# Patient Record
Sex: FEMALE | Race: WHITE | NOT HISPANIC OR LATINO
[De-identification: names, ages, dates, MRNs, and addresses within clinical notes are randomized per-mention and may not be internally consistent; named-entity substitution may affect disease eponyms.]

---

## 2017-01-15 ENCOUNTER — RESULT REVIEW (OUTPATIENT)
Age: 47
End: 2017-01-15

## 2019-08-28 ENCOUNTER — RESULT REVIEW (OUTPATIENT)
Age: 49
End: 2019-08-28

## 2022-07-08 ENCOUNTER — APPOINTMENT (OUTPATIENT)
Dept: HEART AND VASCULAR | Facility: CLINIC | Age: 52
End: 2022-07-08

## 2022-07-08 ENCOUNTER — TRANSCRIPTION ENCOUNTER (OUTPATIENT)
Age: 52
End: 2022-07-08

## 2022-07-08 VITALS
OXYGEN SATURATION: 96 % | HEART RATE: 64 BPM | WEIGHT: 184 LBS | TEMPERATURE: 97.4 F | RESPIRATION RATE: 16 BRPM | DIASTOLIC BLOOD PRESSURE: 84 MMHG | BODY MASS INDEX: 27.25 KG/M2 | SYSTOLIC BLOOD PRESSURE: 140 MMHG | HEIGHT: 69 IN

## 2022-07-08 DIAGNOSIS — Z87.09 PERSONAL HISTORY OF OTHER DISEASES OF THE RESPIRATORY SYSTEM: ICD-10-CM

## 2022-07-08 DIAGNOSIS — Z87.898 PERSONAL HISTORY OF OTHER SPECIFIED CONDITIONS: ICD-10-CM

## 2022-07-08 DIAGNOSIS — Z86.69 PERSONAL HISTORY OF OTHER DISEASES OF THE NERVOUS SYSTEM AND SENSE ORGANS: ICD-10-CM

## 2022-07-08 DIAGNOSIS — Z83.3 FAMILY HISTORY OF DIABETES MELLITUS: ICD-10-CM

## 2022-07-08 DIAGNOSIS — Z82.49 FAMILY HISTORY OF ISCHEMIC HEART DISEASE AND OTHER DISEASES OF THE CIRCULATORY SYSTEM: ICD-10-CM

## 2022-07-08 DIAGNOSIS — Z78.9 OTHER SPECIFIED HEALTH STATUS: ICD-10-CM

## 2022-07-08 DIAGNOSIS — R53.83 OTHER FATIGUE: ICD-10-CM

## 2022-07-08 DIAGNOSIS — R07.89 OTHER CHEST PAIN: ICD-10-CM

## 2022-07-08 DIAGNOSIS — Z87.11 PERSONAL HISTORY OF PEPTIC ULCER DISEASE: ICD-10-CM

## 2022-07-08 DIAGNOSIS — U07.1 COVID-19: ICD-10-CM

## 2022-07-08 DIAGNOSIS — Z87.19 PERSONAL HISTORY OF OTHER DISEASES OF THE DIGESTIVE SYSTEM: ICD-10-CM

## 2022-07-08 DIAGNOSIS — I48.91 UNSPECIFIED ATRIAL FIBRILLATION: ICD-10-CM

## 2022-07-08 DIAGNOSIS — R06.00 DYSPNEA, UNSPECIFIED: ICD-10-CM

## 2022-07-08 DIAGNOSIS — I10 ESSENTIAL (PRIMARY) HYPERTENSION: ICD-10-CM

## 2022-07-08 DIAGNOSIS — R00.2 PALPITATIONS: ICD-10-CM

## 2022-07-08 DIAGNOSIS — Z86.010 PERSONAL HISTORY OF COLONIC POLYPS: ICD-10-CM

## 2022-07-08 PROCEDURE — 99205 OFFICE O/P NEW HI 60 MIN: CPT

## 2022-07-08 PROCEDURE — 93246 EXT ECG>7D<15D RECORDING: CPT

## 2022-07-08 PROCEDURE — 93306 TTE W/DOPPLER COMPLETE: CPT

## 2022-07-08 RX ORDER — METOPROLOL SUCCINATE 25 MG/1
25 TABLET, EXTENDED RELEASE ORAL DAILY
Qty: 30 | Refills: 11 | Status: ACTIVE | COMMUNITY
Start: 2022-07-08 | End: 1900-01-01

## 2022-07-10 PROBLEM — Z86.010 HISTORY OF COLONIC POLYPS: Status: RESOLVED | Noted: 2022-07-10 | Resolved: 2022-07-10

## 2022-07-10 PROBLEM — R06.00 DYSPNEA ON EXERTION: Status: ACTIVE | Noted: 2022-07-10

## 2022-07-10 PROBLEM — Z87.09 HISTORY OF SINUSITIS: Status: RESOLVED | Noted: 2022-07-10 | Resolved: 2022-07-10

## 2022-07-10 PROBLEM — Z86.69 HISTORY OF MIGRAINE HEADACHES: Status: RESOLVED | Noted: 2022-07-10 | Resolved: 2022-07-10

## 2022-07-10 PROBLEM — Z87.19 HISTORY OF IRRITABLE BOWEL SYNDROME: Status: RESOLVED | Noted: 2022-07-10 | Resolved: 2022-07-10

## 2022-07-10 PROBLEM — Z87.11 HISTORY OF PEPTIC ULCER: Status: RESOLVED | Noted: 2022-07-10 | Resolved: 2022-07-10

## 2022-07-10 PROBLEM — I48.91 ATRIAL FIBRILLATION: Status: ACTIVE | Noted: 2022-07-08

## 2022-07-10 PROBLEM — R07.89 CHEST PRESSURE: Status: ACTIVE | Noted: 2022-07-10

## 2022-07-10 PROBLEM — U07.1 COVID-19: Status: RESOLVED | Noted: 2022-07-10 | Resolved: 2022-07-10

## 2022-07-10 PROBLEM — Z87.898 HISTORY OF ALOPECIA: Status: RESOLVED | Noted: 2022-07-10 | Resolved: 2022-07-10

## 2022-07-10 PROBLEM — Z87.19 HISTORY OF GASTROESOPHAGEAL REFLUX (GERD): Status: RESOLVED | Noted: 2022-07-10 | Resolved: 2022-07-10

## 2022-07-10 PROBLEM — R53.83 OTHER FATIGUE: Status: ACTIVE | Noted: 2022-07-10

## 2022-07-10 PROBLEM — R00.2 PALPITATIONS: Status: ACTIVE | Noted: 2022-07-10

## 2022-07-10 PROBLEM — I10 HYPERTENSION: Status: ACTIVE | Noted: 2022-07-10

## 2022-07-10 RX ORDER — UBIDECARENONE/VIT E ACET 100MG-5
CAPSULE ORAL
Refills: 0 | Status: ACTIVE | COMMUNITY

## 2022-07-10 RX ORDER — MULTIVITAMIN
TABLET ORAL
Refills: 0 | Status: ACTIVE | COMMUNITY

## 2022-07-10 RX ORDER — APIXABAN 5 MG/1
5 TABLET, FILM COATED ORAL
Qty: 60 | Refills: 11 | Status: DISCONTINUED | COMMUNITY
Start: 2022-07-08 | End: 2022-07-10

## 2022-07-10 NOTE — PHYSICAL EXAM
[Well Developed] : well developed [Well Nourished] : well nourished [No Acute Distress] : no acute distress [Normal Conjunctiva] : normal conjunctiva [Normal Venous Pressure] : normal venous pressure [No Carotid Bruit] : no carotid bruit [Normal S1, S2] : normal S1, S2 [No Murmur] : no murmur [No Rub] : no rub [No Gallop] : no gallop [Tachycardia] : tachycardic [Irregularly Irregular] : irregularly irregular [Clear Lung Fields] : clear lung fields [Good Air Entry] : good air entry [No Respiratory Distress] : no respiratory distress  [Soft] : abdomen soft [Non Tender] : non-tender [No Masses/organomegaly] : no masses/organomegaly [Normal Bowel Sounds] : normal bowel sounds [Normal Gait] : normal gait [No Edema] : no edema [No Cyanosis] : no cyanosis [No Clubbing] : no clubbing [No Varicosities] : no varicosities [No Rash] : no rash [No Skin Lesions] : no skin lesions [Moves all extremities] : moves all extremities [No Focal Deficits] : no focal deficits [Normal Speech] : normal speech [Alert and Oriented] : alert and oriented [Normal memory] : normal memory

## 2022-07-10 NOTE — DISCUSSION/SUMMARY
[Atrial Fibrillation] : atrial fibrillation [Decompensated] : decompensated [Deteriorating] : deteriorating [Medication Changes Per Orders] : Medication changes are as documented in orders [Sodium Restriction] : sodium restriction [Hypertension] : hypertension [Stable] : stable [None] : There are no changes in medication management [Exercise Regimen] : an exercise regimen [Dietary Modification] : dietary modification [Weight Loss] : weight loss [Low Sodium Diet] : low sodium diet [Patient] : the patient [de-identified] : mild LV sys dysfxn; indeterminant irizarry fxn

## 2022-07-10 NOTE — REVIEW OF SYSTEMS
[Fever] : no fever [Headache] : no headache [Chills] : no chills [Feeling Fatigued] : feeling fatigued [Dysuria] : dysuria [Pelvic Pain] : no pelvic pain [Abn Vaginal Bleeding] : no unexplained vaginal bleeding [Negative] : Heme/Lymph [FreeTextEntry3] : R flank pain, frequency, pressure

## 2022-07-10 NOTE — REASON FOR VISIT
[Symptom and Test Evaluation] : symptom and test evaluation [Arrhythmia/ECG Abnorrmalities] : arrhythmia/ECG abnormalities [FreeTextEntry3] : Latrice Arango

## 2022-07-10 NOTE — HISTORY OF PRESENT ILLNESS
[FreeTextEntry1] : Alanna Lugo is a 53 yo female who presents for CV evaluation.\par \par She was being seen by primary care provider today to assess symptoms of urinary frequency, pressure, and R flank pain - ongoing for nearly 1 week.  While being evaluated, she was noted to be tachycardic with irregular pulse.  ECG performed revealed AFIB with inc VR.  \par \par She notes that she has experienced intermittent chest pressure, inc GAINES, fatigue, and palps for 1.5 years.  She denies pnd, orthopnea, edema, or loc.  \par \par She is active.  She is complaint with meds.\par \par ECG today reveals AFIB with inc VR.\par \par TTE 7/2022: mild LV sys dysfxn; indeterminant irizarry fxn\par \par Clinical hx reviewed in detail.\par \par Recommendations:\par 1. initiate DOAC (only Xarelto covered by insurance) and metoprolol succinate 25 mg daily\par 2. TTE (done)\par 3. blood work done with primary care - obtain results\par 4. EXSE\par 5. carotid duplex\par 6. ZIO\par 7. EP eval

## 2022-07-20 ENCOUNTER — APPOINTMENT (OUTPATIENT)
Dept: HEART AND VASCULAR | Facility: CLINIC | Age: 52
End: 2022-07-20

## 2022-07-20 VITALS
BODY MASS INDEX: 28.19 KG/M2 | DIASTOLIC BLOOD PRESSURE: 86 MMHG | SYSTOLIC BLOOD PRESSURE: 128 MMHG | WEIGHT: 186 LBS | TEMPERATURE: 98.1 F | OXYGEN SATURATION: 99 % | HEIGHT: 68 IN

## 2022-07-20 PROCEDURE — 94621 CARDIOPULM EXERCISE TESTING: CPT

## 2022-07-20 PROCEDURE — 94010 BREATHING CAPACITY TEST: CPT | Mod: 59

## 2022-07-21 PROCEDURE — 93248 EXT ECG>7D<15D REV&INTERPJ: CPT

## 2022-07-29 ENCOUNTER — NON-APPOINTMENT (OUTPATIENT)
Age: 52
End: 2022-07-29

## 2022-08-05 ENCOUNTER — APPOINTMENT (OUTPATIENT)
Dept: HEART AND VASCULAR | Facility: CLINIC | Age: 52
End: 2022-08-05

## 2023-08-05 ENCOUNTER — RX RENEWAL (OUTPATIENT)
Age: 53
End: 2023-08-05

## 2023-08-05 RX ORDER — RIVAROXABAN 20 MG/1
20 TABLET, FILM COATED ORAL
Qty: 90 | Refills: 3 | Status: ACTIVE | COMMUNITY
Start: 2022-07-10 | End: 1900-01-01

## 2023-12-15 ENCOUNTER — APPOINTMENT (OUTPATIENT)
Dept: RHEUMATOLOGY | Facility: CLINIC | Age: 53
End: 2023-12-15
Payer: COMMERCIAL

## 2023-12-15 VITALS
WEIGHT: 192 LBS | HEIGHT: 68 IN | OXYGEN SATURATION: 98 % | BODY MASS INDEX: 29.1 KG/M2 | HEART RATE: 68 BPM | DIASTOLIC BLOOD PRESSURE: 64 MMHG | SYSTOLIC BLOOD PRESSURE: 120 MMHG

## 2023-12-15 DIAGNOSIS — Z82.61 FAMILY HISTORY OF ARTHRITIS: ICD-10-CM

## 2023-12-15 DIAGNOSIS — L40.50 ARTHROPATHIC PSORIASIS, UNSPECIFIED: ICD-10-CM

## 2023-12-15 DIAGNOSIS — M25.50 PAIN IN UNSPECIFIED JOINT: ICD-10-CM

## 2023-12-15 DIAGNOSIS — M19.90 UNSPECIFIED OSTEOARTHRITIS, UNSPECIFIED SITE: ICD-10-CM

## 2023-12-15 DIAGNOSIS — Z84.0 FAMILY HISTORY OF DISEASES OF THE SKIN AND SUBCUTANEOUS TISSUE: ICD-10-CM

## 2023-12-15 PROCEDURE — 99204 OFFICE O/P NEW MOD 45 MIN: CPT

## 2023-12-15 RX ORDER — MAGNESIUM OXIDE/MAG AA CHELATE 300 MG
CAPSULE ORAL
Refills: 0 | Status: DISCONTINUED | COMMUNITY
End: 2023-12-15

## 2023-12-15 RX ORDER — TURM/GING/BOS/YUC/WIL/CHAM/HOR 100-100 MG
TABLET ORAL
Refills: 0 | Status: ACTIVE | COMMUNITY

## 2023-12-15 NOTE — PHYSICAL EXAM
[General Appearance - Alert] : alert [General Appearance - In No Acute Distress] : in no acute distress [Neck Appearance] : the appearance of the neck was normal [Auscultation Breath Sounds / Voice Sounds] : lungs were clear to auscultation bilaterally [Heart Rate And Rhythm] : heart rate was normal and rhythm regular [Heart Sounds] : normal S1 and S2 [FreeTextEntry1] : ttp over pip, no swelling, no deformities, FROM, normal MS upper and lower extremity joints [] : no rash [No Focal Deficits] : no focal deficits [Oriented To Time, Place, And Person] : oriented to person, place, and time

## 2023-12-15 NOTE — HISTORY OF PRESENT ILLNESS
[FreeTextEntry1] : 52yo W in the office for evaluation of arthritis  History: for the past few months with progressive pain predominant hand over pips 2-3rd finger and base thumb b/l description of swelling am stiffness less than 10 mins  patient reports history of inflammatory arthritis Psa impression from around 13 years ago similar symptoms tried MTX couple of years enbrel 1 year humira less than one year stop regular rheum follow up for at least 5 years pcp referral due to increase symptoms and reports of PsA at the time of her initial evaluation though to have RA but developed a rash that suggested PsO  Today no skin rash suggestive of PsO distribution of the rash over eye aorund the orbital area

## 2023-12-17 LAB
HAV IGM SER QL: NONREACTIVE
HBV CORE IGM SER QL: NONREACTIVE
HBV SURFACE AG SER QL: NONREACTIVE
HCV AB SER QL: NONREACTIVE
HCV S/CO RATIO: 0.06 S/CO
RHEUMATOID FACT SER QL: <10 IU/ML

## 2023-12-18 LAB
CCP AB SER IA-ACNC: <8 UNITS
RF+CCP IGG SER-IMP: NEGATIVE

## 2023-12-19 LAB — HLA-B27 QL NAA+PROBE: NORMAL

## 2023-12-20 LAB
M TB IFN-G BLD-IMP: NEGATIVE
QUANTIFERON TB PLUS MITOGEN MINUS NIL: 8.97 IU/ML
QUANTIFERON TB PLUS NIL: 0.02 IU/ML
QUANTIFERON TB PLUS TB1 MINUS NIL: 0.01 IU/ML
QUANTIFERON TB PLUS TB2 MINUS NIL: 0.01 IU/ML